# Patient Record
Sex: FEMALE | Race: WHITE | Employment: OTHER | URBAN - METROPOLITAN AREA
[De-identification: names, ages, dates, MRNs, and addresses within clinical notes are randomized per-mention and may not be internally consistent; named-entity substitution may affect disease eponyms.]

---

## 2017-10-02 ENCOUNTER — HOSPITAL ENCOUNTER (OUTPATIENT)
Dept: MAMMOGRAPHY | Age: 47
Discharge: HOME | End: 2017-10-02

## 2017-10-02 DIAGNOSIS — R22.32 LOCALIZED SWELLING, MASS AND LUMP, LEFT UPPER LIMB: ICD-10-CM

## 2017-10-02 DIAGNOSIS — R22.32 AXILLARY MASS, LEFT: ICD-10-CM

## 2017-10-02 DIAGNOSIS — Z12.31 SCREENING MAMMOGRAM, ENCOUNTER FOR: ICD-10-CM

## 2017-10-26 ENCOUNTER — HOSPITAL ENCOUNTER (OUTPATIENT)
Dept: MAMMOGRAPHY | Age: 47
Discharge: HOME | End: 2017-10-26
Attending: OBSTETRICS & GYNECOLOGY | Admitting: OBSTETRICS & GYNECOLOGY

## 2017-10-26 DIAGNOSIS — N63.10 MASS OF RIGHT BREAST: ICD-10-CM

## 2017-10-26 DIAGNOSIS — N63.20 LEFT BREAST LUMP: ICD-10-CM

## 2020-07-31 ENCOUNTER — APPOINTMENT (OUTPATIENT)
Dept: CT IMAGING | Age: 50
DRG: 390 | End: 2020-07-31
Payer: MEDICARE

## 2020-07-31 ENCOUNTER — APPOINTMENT (OUTPATIENT)
Dept: GENERAL RADIOLOGY | Age: 50
DRG: 390 | End: 2020-07-31
Payer: MEDICARE

## 2020-07-31 ENCOUNTER — HOSPITAL ENCOUNTER (INPATIENT)
Age: 50
LOS: 3 days | Discharge: HOME | DRG: 390 | End: 2020-08-03
Attending: STUDENT IN AN ORGANIZED HEALTH CARE EDUCATION/TRAINING PROGRAM | Admitting: HOSPITALIST
Payer: MEDICARE

## 2020-07-31 PROBLEM — K56.609 SBO (SMALL BOWEL OBSTRUCTION) (HCC): Status: ACTIVE | Noted: 2020-07-31

## 2020-07-31 LAB
A/G RATIO: 1.7 (ref 1.1–2.2)
ALBUMIN SERPL-MCNC: 5 G/DL (ref 3.4–5)
ALP BLD-CCNC: 63 U/L (ref 40–129)
ALT SERPL-CCNC: 8 U/L (ref 10–40)
ANION GAP SERPL CALCULATED.3IONS-SCNC: 14 MMOL/L (ref 3–16)
AST SERPL-CCNC: 13 U/L (ref 15–37)
BASOPHILS ABSOLUTE: 0.2 K/UL (ref 0–0.2)
BASOPHILS RELATIVE PERCENT: 1.1 %
BILIRUB SERPL-MCNC: <0.2 MG/DL (ref 0–1)
BILIRUBIN URINE: NEGATIVE
BLOOD, URINE: NEGATIVE
BUN BLDV-MCNC: 9 MG/DL (ref 7–20)
CALCIUM SERPL-MCNC: 10.1 MG/DL (ref 8.3–10.6)
CHLORIDE BLD-SCNC: 95 MMOL/L (ref 99–110)
CLARITY: CLEAR
CO2: 30 MMOL/L (ref 21–32)
COLOR: YELLOW
CREAT SERPL-MCNC: 0.7 MG/DL (ref 0.6–1.1)
EOSINOPHILS ABSOLUTE: 0.4 K/UL (ref 0–0.6)
EOSINOPHILS RELATIVE PERCENT: 2.2 %
GFR AFRICAN AMERICAN: >60
GFR NON-AFRICAN AMERICAN: >60
GLOBULIN: 2.9 G/DL
GLUCOSE BLD-MCNC: 100 MG/DL (ref 70–99)
GLUCOSE URINE: NEGATIVE MG/DL
HCT VFR BLD CALC: 47.2 % (ref 36–48)
HEMOGLOBIN: 15.8 G/DL (ref 12–16)
KETONES, URINE: NEGATIVE MG/DL
LACTIC ACID: 1.4 MMOL/L (ref 0.4–2)
LEUKOCYTE ESTERASE, URINE: NEGATIVE
LIPASE: 13 U/L (ref 13–60)
LYMPHOCYTES ABSOLUTE: 4.5 K/UL (ref 1–5.1)
LYMPHOCYTES RELATIVE PERCENT: 24.7 %
MCH RBC QN AUTO: 32.4 PG (ref 26–34)
MCHC RBC AUTO-ENTMCNC: 33.5 G/DL (ref 31–36)
MCV RBC AUTO: 96.8 FL (ref 80–100)
MICROSCOPIC EXAMINATION: NORMAL
MONOCYTES ABSOLUTE: 0.8 K/UL (ref 0–1.3)
MONOCYTES RELATIVE PERCENT: 4.3 %
NEUTROPHILS ABSOLUTE: 12.4 K/UL (ref 1.7–7.7)
NEUTROPHILS RELATIVE PERCENT: 67.7 %
NITRITE, URINE: NEGATIVE
PDW BLD-RTO: 14 % (ref 12.4–15.4)
PH UA: 7 (ref 5–8)
PLATELET # BLD: 379 K/UL (ref 135–450)
PMV BLD AUTO: 8.1 FL (ref 5–10.5)
POTASSIUM REFLEX MAGNESIUM: 3.6 MMOL/L (ref 3.5–5.1)
PROTEIN UA: NEGATIVE MG/DL
RBC # BLD: 4.87 M/UL (ref 4–5.2)
SODIUM BLD-SCNC: 139 MMOL/L (ref 136–145)
SPECIFIC GRAVITY UA: <=1.005 (ref 1–1.03)
TOTAL PROTEIN: 7.9 G/DL (ref 6.4–8.2)
URINE REFLEX TO CULTURE: NORMAL
URINE TYPE: NORMAL
UROBILINOGEN, URINE: 0.2 E.U./DL
WBC # BLD: 18.3 K/UL (ref 4–11)

## 2020-07-31 PROCEDURE — 2580000003 HC RX 258: Performed by: STUDENT IN AN ORGANIZED HEALTH CARE EDUCATION/TRAINING PROGRAM

## 2020-07-31 PROCEDURE — 6370000000 HC RX 637 (ALT 250 FOR IP): Performed by: INTERNAL MEDICINE

## 2020-07-31 PROCEDURE — 96375 TX/PRO/DX INJ NEW DRUG ADDON: CPT

## 2020-07-31 PROCEDURE — 1200000000 HC SEMI PRIVATE

## 2020-07-31 PROCEDURE — 99222 1ST HOSP IP/OBS MODERATE 55: CPT | Performed by: SURGERY

## 2020-07-31 PROCEDURE — 85025 COMPLETE CBC W/AUTO DIFF WBC: CPT

## 2020-07-31 PROCEDURE — 81003 URINALYSIS AUTO W/O SCOPE: CPT

## 2020-07-31 PROCEDURE — 80053 COMPREHEN METABOLIC PANEL: CPT

## 2020-07-31 PROCEDURE — 6360000002 HC RX W HCPCS: Performed by: STUDENT IN AN ORGANIZED HEALTH CARE EDUCATION/TRAINING PROGRAM

## 2020-07-31 PROCEDURE — 96365 THER/PROPH/DIAG IV INF INIT: CPT

## 2020-07-31 PROCEDURE — 6360000002 HC RX W HCPCS: Performed by: SURGERY

## 2020-07-31 PROCEDURE — 74177 CT ABD & PELVIS W/CONTRAST: CPT

## 2020-07-31 PROCEDURE — 2500000003 HC RX 250 WO HCPCS: Performed by: STUDENT IN AN ORGANIZED HEALTH CARE EDUCATION/TRAINING PROGRAM

## 2020-07-31 PROCEDURE — 2580000003 HC RX 258: Performed by: SURGERY

## 2020-07-31 PROCEDURE — 71045 X-RAY EXAM CHEST 1 VIEW: CPT

## 2020-07-31 PROCEDURE — 2500000003 HC RX 250 WO HCPCS: Performed by: SURGERY

## 2020-07-31 PROCEDURE — 83605 ASSAY OF LACTIC ACID: CPT

## 2020-07-31 PROCEDURE — 6360000004 HC RX CONTRAST MEDICATION: Performed by: STUDENT IN AN ORGANIZED HEALTH CARE EDUCATION/TRAINING PROGRAM

## 2020-07-31 PROCEDURE — 2580000003 HC RX 258

## 2020-07-31 PROCEDURE — C9113 INJ PANTOPRAZOLE SODIUM, VIA: HCPCS | Performed by: SURGERY

## 2020-07-31 PROCEDURE — 83690 ASSAY OF LIPASE: CPT

## 2020-07-31 PROCEDURE — 99285 EMERGENCY DEPT VISIT HI MDM: CPT

## 2020-07-31 RX ORDER — AMITRIPTYLINE HYDROCHLORIDE 50 MG/1
100 TABLET, FILM COATED ORAL NIGHTLY
Status: DISCONTINUED | OUTPATIENT
Start: 2020-07-31 | End: 2020-07-31

## 2020-07-31 RX ORDER — OXYCODONE HCL 40 MG/1
40 TABLET, FILM COATED, EXTENDED RELEASE ORAL EVERY 12 HOURS SCHEDULED
Status: DISCONTINUED | OUTPATIENT
Start: 2020-07-31 | End: 2020-08-03 | Stop reason: HOSPADM

## 2020-07-31 RX ORDER — SODIUM CHLORIDE 0.9 % (FLUSH) 0.9 %
SYRINGE (ML) INJECTION
Status: COMPLETED
Start: 2020-07-31 | End: 2020-07-31

## 2020-07-31 RX ORDER — DOCUSATE SODIUM 100 MG/1
100 CAPSULE, LIQUID FILLED ORAL 2 TIMES DAILY
COMMUNITY

## 2020-07-31 RX ORDER — AMITRIPTYLINE HYDROCHLORIDE 50 MG/1
50 TABLET, FILM COATED ORAL NIGHTLY
Status: DISCONTINUED | OUTPATIENT
Start: 2020-07-31 | End: 2020-08-03 | Stop reason: HOSPADM

## 2020-07-31 RX ORDER — DICYCLOMINE HCL 20 MG
20 TABLET ORAL EVERY 6 HOURS PRN
COMMUNITY

## 2020-07-31 RX ORDER — ONDANSETRON 2 MG/ML
4 INJECTION INTRAMUSCULAR; INTRAVENOUS ONCE
Status: COMPLETED | OUTPATIENT
Start: 2020-07-31 | End: 2020-07-31

## 2020-07-31 RX ORDER — ONDANSETRON 2 MG/ML
4 INJECTION INTRAMUSCULAR; INTRAVENOUS EVERY 4 HOURS PRN
Status: DISCONTINUED | OUTPATIENT
Start: 2020-07-31 | End: 2020-08-03 | Stop reason: HOSPADM

## 2020-07-31 RX ORDER — 0.9 % SODIUM CHLORIDE 0.9 %
1000 INTRAVENOUS SOLUTION INTRAVENOUS ONCE
Status: COMPLETED | OUTPATIENT
Start: 2020-07-31 | End: 2020-07-31

## 2020-07-31 RX ORDER — SODIUM CHLORIDE 9 MG/ML
INJECTION, SOLUTION INTRAVENOUS CONTINUOUS
Status: DISCONTINUED | OUTPATIENT
Start: 2020-07-31 | End: 2020-08-03

## 2020-07-31 RX ORDER — PANTOPRAZOLE SODIUM 40 MG/10ML
40 INJECTION, POWDER, LYOPHILIZED, FOR SOLUTION INTRAVENOUS DAILY
Status: DISCONTINUED | OUTPATIENT
Start: 2020-07-31 | End: 2020-08-03 | Stop reason: HOSPADM

## 2020-07-31 RX ORDER — LISINOPRIL AND HYDROCHLOROTHIAZIDE 12.5; 1 MG/1; MG/1
1 TABLET ORAL DAILY
Status: DISCONTINUED | OUTPATIENT
Start: 2020-07-31 | End: 2020-07-31

## 2020-07-31 RX ORDER — GABAPENTIN 400 MG/1
800 CAPSULE ORAL 4 TIMES DAILY
Status: DISCONTINUED | OUTPATIENT
Start: 2020-07-31 | End: 2020-07-31

## 2020-07-31 RX ORDER — OXYBUTYNIN CHLORIDE 15 MG/1
15 TABLET, EXTENDED RELEASE ORAL 2 TIMES DAILY
COMMUNITY

## 2020-07-31 RX ORDER — FENTANYL CITRATE 50 UG/ML
50 INJECTION, SOLUTION INTRAMUSCULAR; INTRAVENOUS ONCE
Status: COMPLETED | OUTPATIENT
Start: 2020-07-31 | End: 2020-07-31

## 2020-07-31 RX ORDER — GABAPENTIN 400 MG/1
400 CAPSULE ORAL 4 TIMES DAILY
Status: DISCONTINUED | OUTPATIENT
Start: 2020-07-31 | End: 2020-08-03 | Stop reason: HOSPADM

## 2020-07-31 RX ORDER — DIPHENHYDRAMINE HYDROCHLORIDE 50 MG/ML
25 INJECTION INTRAMUSCULAR; INTRAVENOUS EVERY 6 HOURS PRN
Status: DISCONTINUED | OUTPATIENT
Start: 2020-07-31 | End: 2020-08-03 | Stop reason: HOSPADM

## 2020-07-31 RX ORDER — PROMETHAZINE HYDROCHLORIDE 25 MG/1
25 TABLET ORAL EVERY 6 HOURS PRN
COMMUNITY

## 2020-07-31 RX ORDER — LEVOCETIRIZINE DIHYDROCHLORIDE 5 MG/1
5 TABLET, FILM COATED ORAL NIGHTLY
COMMUNITY

## 2020-07-31 RX ORDER — METRONIDAZOLE 250 MG/1
500 TABLET ORAL ONCE
Status: DISCONTINUED | OUTPATIENT
Start: 2020-07-31 | End: 2020-07-31

## 2020-07-31 RX ADMIN — GABAPENTIN 400 MG: 400 CAPSULE ORAL at 20:15

## 2020-07-31 RX ADMIN — HYDROMORPHONE HYDROCHLORIDE 1 MG: 1 INJECTION, SOLUTION INTRAMUSCULAR; INTRAVENOUS; SUBCUTANEOUS at 06:51

## 2020-07-31 RX ADMIN — ONDANSETRON HYDROCHLORIDE 4 MG: 2 INJECTION, SOLUTION INTRAMUSCULAR; INTRAVENOUS at 06:51

## 2020-07-31 RX ADMIN — IOPAMIDOL 75 ML: 755 INJECTION, SOLUTION INTRAVENOUS at 04:55

## 2020-07-31 RX ADMIN — HYDROMORPHONE HYDROCHLORIDE 1 MG: 1 INJECTION, SOLUTION INTRAMUSCULAR; INTRAVENOUS; SUBCUTANEOUS at 10:29

## 2020-07-31 RX ADMIN — SODIUM CHLORIDE: 9 INJECTION, SOLUTION INTRAVENOUS at 07:37

## 2020-07-31 RX ADMIN — OXYCODONE HYDROCHLORIDE 40 MG: 40 TABLET, FILM COATED, EXTENDED RELEASE ORAL at 20:16

## 2020-07-31 RX ADMIN — Medication 10 ML: at 10:29

## 2020-07-31 RX ADMIN — METRONIDAZOLE 500 MG: 500 INJECTION, SOLUTION INTRAVENOUS at 06:29

## 2020-07-31 RX ADMIN — ENOXAPARIN SODIUM 40 MG: 40 INJECTION SUBCUTANEOUS at 10:29

## 2020-07-31 RX ADMIN — AMITRIPTYLINE HYDROCHLORIDE 50 MG: 50 TABLET, FILM COATED ORAL at 20:16

## 2020-07-31 RX ADMIN — PANTOPRAZOLE SODIUM 40 MG: 40 INJECTION, POWDER, FOR SOLUTION INTRAVENOUS at 10:29

## 2020-07-31 RX ADMIN — SODIUM CHLORIDE 1000 ML: 9 INJECTION, SOLUTION INTRAVENOUS at 05:54

## 2020-07-31 RX ADMIN — SODIUM CHLORIDE 1000 ML: 9 INJECTION, SOLUTION INTRAVENOUS at 03:52

## 2020-07-31 RX ADMIN — ONDANSETRON HYDROCHLORIDE 4 MG: 2 INJECTION, SOLUTION INTRAMUSCULAR; INTRAVENOUS at 18:52

## 2020-07-31 RX ADMIN — ONDANSETRON HYDROCHLORIDE 4 MG: 2 INJECTION, SOLUTION INTRAMUSCULAR; INTRAVENOUS at 03:52

## 2020-07-31 RX ADMIN — FENTANYL CITRATE 50 MCG: 50 INJECTION, SOLUTION INTRAMUSCULAR; INTRAVENOUS at 03:53

## 2020-07-31 RX ADMIN — CEFTRIAXONE SODIUM 1 G: 1 INJECTION, POWDER, FOR SOLUTION INTRAMUSCULAR; INTRAVENOUS at 05:54

## 2020-07-31 ASSESSMENT — PAIN SCALES - GENERAL
PAINLEVEL_OUTOF10: 8
PAINLEVEL_OUTOF10: 8
PAINLEVEL_OUTOF10: 5
PAINLEVEL_OUTOF10: 8
PAINLEVEL_OUTOF10: 5
PAINLEVEL_OUTOF10: 10

## 2020-07-31 ASSESSMENT — PAIN DESCRIPTION - LOCATION
LOCATION: ABDOMEN
LOCATION: GENERALIZED

## 2020-07-31 ASSESSMENT — PAIN DESCRIPTION - PROGRESSION: CLINICAL_PROGRESSION: GRADUALLY WORSENING

## 2020-07-31 ASSESSMENT — PAIN - FUNCTIONAL ASSESSMENT: PAIN_FUNCTIONAL_ASSESSMENT: ACTIVITIES ARE NOT PREVENTED

## 2020-07-31 ASSESSMENT — PAIN DESCRIPTION - ONSET: ONSET: AWAKENED FROM SLEEP

## 2020-07-31 ASSESSMENT — PAIN DESCRIPTION - PAIN TYPE: TYPE: ACUTE PAIN

## 2020-07-31 NOTE — ED PROVIDER NOTES
status: Current Every Day Smoker     Packs/day: 0.50     Types: Cigarettes    Smokeless tobacco: Never Used   Substance and Sexual Activity    Alcohol use: Yes     Comment: rarely    Drug use: No    Sexual activity: None   Lifestyle    Physical activity     Days per week: None     Minutes per session: None    Stress: None   Relationships    Social connections     Talks on phone: None     Gets together: None     Attends Quaker service: None     Active member of club or organization: None     Attends meetings of clubs or organizations: None     Relationship status: None    Intimate partner violence     Fear of current or ex partner: None     Emotionally abused: None     Physically abused: None     Forced sexual activity: None   Other Topics Concern    None   Social History Narrative    None        Review of Systems   10 total systems reviewed and found to be negative unless otherwise noted in HPI     Physical Exam   BP (!) 94/56   Pulse 88   Temp 98.9 °F (37.2 °C) (Oral)   Resp 16   Ht 5' 3.5\" (1.613 m)   Wt 156 lb (70.8 kg)   LMP 08/24/2013   SpO2 94%   BMI 27.20 kg/m²      CONSTITUTIONAL: Appears to be in distress and moaning in pain  HEAD: atraumatic, normocephalic   EYES: PERRL, No injection, discharge or scleral icterus. ENT: Moist mucous membranes. NECK: Normal ROM, NO LAD   CARDIOVASCULAR: Regular rate and rhythm. No murmurs or gallop. PULMONARY/CHEST: Airway patent. No retractions. Breath sounds clear with good air entry bilaterally. ABDOMEN: Soft, Non-distended but diffusely tender with some rebound  SKIN: Acyanotic, warm, dry   MUSCULOSKELETAL: No swelling, tenderness or deformity   NEUROLOGICAL: Awake and oriented x 3. Pulses intact. Grossly nonfocal   Nursing note and vitals reviewed.      ED Course & Medical Decision Making   Medications   cefTRIAXone (ROCEPHIN) 1 g IVPB in 50 mL D5W minibag (0 g Intravenous Stopped 7/31/20 5847)   0.9 % sodium chloride bolus (1,000 mLs transition point in the left lower quadrant. Mild degree of likely reactive associated mesenteric edema. Findings may   relate to adhesions. Mild biliary ductal dilatation without obvious etiology. Gallbladder still   in place. No pancreatic or other liver abnormality. Clinical and laboratory   correlation suggested and if concern for biliary obstruction consider further   evaluation with MRI/MRCP or ERCP. XR CHEST PORTABLE   Final Result   No acute cardiopulmonary abnormality. No pneumoperitoneum. PROCEDURES:   Procedures    ASSESSMENT AND PLAN:  Jay Mari is a 51 y.o. female history of bowel perforation with 2 small bowel obstructions which were medically managed presenting this morning complaining of abdominal pain started last night persisted forcing her to seek care. On exam patient clear to be in distress and moaning in pain and abdomen was tender to palpation diffusely with some guarding. Given her history and presentation I was concerned for bowel obstruction and I did obtain labs as well as imaging. Labs with leukocytosis of 18 but normal lactate. CT showed bowel obstruction with transition point in the lower left. I believe that her pain is secondary to bowel obstruction. Because of leukocytosis I did order ceftriaxone and Flagyl. Surgery was consulted and recommended no intervention at this time with medical management with NG tube. Attempted to place NG but patient refused and stated that she has had significant problems in the past.  I did talk to the surgeon who recommended another NG tube placement but patient again refused. Please see nurses note. ClINICAL IMPRESSION:  1. Small bowel obstruction ([address]        DISPOSITION Admitted 07/31/2020 06:23:25 AM   -Findings and recommendations explained to patient. She expressed understanding and agreed with the plan.   Shanita Parks MD (electronically

## 2020-07-31 NOTE — PROGRESS NOTES
Pt's primary RN Anabelle notified this RN that Pt had home medication bottle out of purse while she was in room. This RN went to bedside and requested that all home medications be locked up until Pt's friend Tulio Clark left the building and is able to take medications home. Pt states that Tulio Clark is leaving right away and that she would take the medications. This RN searched Pt's remaining belongings to confirm that all medications have been removed. Upon searching Pt's purse additional bottle of pills removed and given to Pt's friend Mark Miller to take home. Pt insists that she would never take any of her home medications and denies taking any medications other than what has been given by hospital nurse. When this RN left room Pt's friend Tulio Clark stopped in hallway and told this RN that she has been friends with the patient for about a year now and she believes that she is very dependent on her medications. Tulio Clark states that she is glad that we insisted that medications be removed and that she has concerns that Pt might have taken additional medication while Tulio Clark was in the bathroom. Pt remains in bed with purse beside her. Pt alert and oriented respirations easy and unlabored. Primary RN Bernarda Keith notified about all listed above.

## 2020-07-31 NOTE — PROGRESS NOTES
Telesitter placed at bedside for patient's safety. Pt resting in bed with eyes closed at this time, respirations easy and unlabored. Call light within reach, bed alarm on. Primary RN Anabelle aware.

## 2020-07-31 NOTE — PLAN OF CARE
Problem: Pain:  Goal: Pain level will decrease  Description: Pain level will decrease  Outcome: Ongoing     Problem: Pain:  Goal: Control of acute pain  Description: Control of acute pain  Outcome: Ongoing     Problem: Pain:  Goal: Control of chronic pain  Description: Control of chronic pain  Outcome: Ongoing     Problem: Falls - Risk of:  Goal: Will remain free from falls  Description: Will remain free from falls  Outcome: Ongoing     Problem: Falls - Risk of:  Goal: Absence of physical injury  Description: Absence of physical injury  Outcome: Ongoing

## 2020-07-31 NOTE — ED NOTES
0872 Dr Rosalee Rocha called back and spoke to Dr Jt Trevino, consult completed     Rose Marie Juarez  07/31/20 9796
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
## 2020-07-31 NOTE — PROGRESS NOTES
Pt assisted up to bathroom. Pt passing gas. Also c/o abdominal pain 7/10 unable to medicate d/t blood pressure  low.

## 2020-07-31 NOTE — H&P
down. So, she came to the ER for evaluation. In the ER, the patient was noted to have a leukocytosis. A CT of the abdomen and pelvis was performed and demonstrated the above findings. We admitted the patient for intravenous fluids, serial abdominal examinations and intravenous pain/nausea control. PT has a pain management contract she takes Oxy 30 mg 4 times per day and OxyContin 60 mg BID. The pain is in her back. The patient was seen and evaluated by Dr. Panchito Emanuel on 9/28/16 for a SBO which resolved with conservative treatment. Pt reports this pain feels similar. Currently, the patient states her abdominal pain \"is improved. \" She denies any nausea or vomiting at this time. PSBO 4/10/12: resolved with conservative treatment. Past Medical History   has a past medical history of Collapsed vertebra ([address] Depression, Hyperlipidemia, Hypertension, Knee gives out, and Leg length inequality. Past Surgical History   has a past surgical history that includes Small intestine surgery (1996); Appendectomy; Ovary removal; knee surgery (Left, 6/27/13); ovarian cyst removal; and Colonoscopy (10/24/13). Pt reports ex lap with SBR and 15 laparoscopic GYN related surgeries. Medications  Prior to Admission medications    Medication Sig Start Date End Date Taking?  Authorizing Provider   docusate sodium (COLACE) 100 MG capsule Take 100 mg by mouth 2 times daily   Yes Historical Provider, MD   oxybutynin (DITROPAN XL) 15 MG extended release tablet Take 15 mg by mouth 2 times daily   Yes Historical Provider, MD   promethazine (PHENERGAN) 25 MG tablet Take 25 mg by mouth every 6 hours as needed for Nausea   Yes Historical Provider, MD   dicyclomine (BENTYL) 20 MG tablet Take 20 mg by mouth every 6 hours as needed (For cramps)   Yes Historical Provider, MD   bisacodyl (DULCOLAX) 5 MG EC tablet Take 5 mg by mouth daily   Yes Historical Provider, MD   levocetirizine (XYZAL) 5 MG tablet Take 5 mg by mouth nightly   Yes Historical Provider, MD   simvastatin (ZOCOR) 40 MG tablet Take 40 mg by mouth nightly   Yes Historical Provider, MD   lisinopril-hydrochlorothiazide (PRINZIDE;ZESTORETIC) 10-12.5 MG per tablet Take 1 tablet by mouth daily   Yes Historical Provider, MD   oxyCODONE (OXY-IR) 15 MG immediate release tablet Take 30 mg by mouth every 6 hours as needed for Pain. Yes Historical Provider, MD   omeprazole (PRILOSEC) 40 MG capsule Take 40 mg by mouth daily. Yes Historical Provider, MD   gabapentin (NEURONTIN) 300 MG capsule Take 800 mg by mouth 4 times daily. Yes Historical Provider, MD   amitriptyline (ELAVIL) 100 MG tablet Take 100 mg by mouth nightly. Yes Historical Provider, MD   oxycodone (OXYCONTIN) 40 MG CR tablet Take 60 mg by mouth every 12 hours. Yes Historical Provider, MD    Scheduled Meds:   pantoprazole  40 mg Intravenous Daily    enoxaparin  40 mg Subcutaneous Daily    amitriptyline  50 mg Oral Nightly    oxyCODONE  40 mg Oral 2 times per day    gabapentin  400 mg Oral 4x Daily     Continuous Infusions:   sodium chloride 125 mL/hr at 07/31/20 0737     PRN Meds:.phenol, HYDROmorphone, ondansetron, diphenhydrAMINE    Allergies  is allergic to morphine sulfate; ampicillin; and penicillins. Family History  family history includes Cancer in her paternal grandmother; Heart Attack in her father; No Known Problems in her mother. Social History   reports that she has been smoking cigarettes. She has a 15.00 pack-year smoking history. She has never used smokeless tobacco. She reports current alcohol use. She reports that she does not use drugs.     Review of Systems:  General Denies any fever or chills  HEENT Denies any diplopia, tinnitus or vertigo  Resp Denies any shortness of breath, cough or wheezing  Cardiac Denies any chest pain, palpitations, claudication or edema  GI Positive for nausea, vomiting and abdominal pain  Denies any melena, hematochezia, hematemesis or pyrosis   Denies any frequency, MCHC 33.5 07/31/2020    RDW 14.0 07/31/2020    SEGSPCT 54.1 04/11/2012    BANDSPCT 1 09/29/2016    LYMPHOPCT 24.7 07/31/2020    MONOPCT 4.3 07/31/2020    MYELOPCT 1 09/29/2016    EOSPCT 2.8 04/11/2012    BASOPCT 1.1 07/31/2020    MONOSABS 0.8 07/31/2020    LYMPHSABS 4.5 07/31/2020    EOSABS 0.4 07/31/2020    BASOSABS 0.2 07/31/2020    DIFFTYPE Auto 04/11/2012     CMP:    Lab Results   Component Value Date     07/31/2020    K 3.6 07/31/2020    CL 95 07/31/2020    CO2 30 07/31/2020    BUN 9 07/31/2020    CREATININE 0.7 07/31/2020    GFRAA >60 07/31/2020    GFRAA >60 04/11/2012    AGRATIO 1.7 07/31/2020    LABGLOM >60 07/31/2020    GLUCOSE 100 07/31/2020    PROT 7.9 07/31/2020    PROT 7.5 04/09/2012    LABALBU 5.0 07/31/2020    CALCIUM 10.1 07/31/2020    BILITOT <0.2 07/31/2020    ALKPHOS 63 07/31/2020    AST 13 07/31/2020    ALT 8 07/31/2020       RADIOLOGY:   I have personally reviewed the following films:  CT scan abd/pelvis: See radiology report        Electronically signed by MACKENZIE Mason CNP on 7/31/2020 at 2:21 PM

## 2020-07-31 NOTE — CARE COORDINATION
Case Management Assessment  Initial Evaluation    Date/Time of Evaluation: 7/31/2020 2:26 PM  Assessment Completed by: Pacheco Hinds    Patient Name: Lisa Tamez  YOB: 1970  Diagnosis: SBO (small bowel obstruction) ([address] [H86.311]  Date / Time: 7/31/2020  3:05 AM  Admission status/Date:7/31/2020 0305 inpt  Chart Reviewed: Yes      Patient Interviewed: Yes   Family Interviewed:  No      Hospitalization in the last 30 days:  No    Contacts  :     Relationship to Patient:   Phone Number:    Alternate Contact:     Relationship to Patient:     Phone Number:    Met with:    Current PCP  Dr. Eligio Guardado in [address] [place] [address] Avenue required for SNF : [facility]        3 night stay required - Y, N    ADLS  Support Systems: Friends/Neighbors  Transportation: self    Meal Preparation: self    Housing  Home Environment: 2nd floor apt alone  Steps: 8  Plans to Return to Present Housing: Yes  Other Identified Issues:     Home Care Information  Currently active with [address] : No  Type of Home Care Services: None  Passport/Waiver : No  :                      Phone Number:    Passport/Waiver Services:           Durable Medical Equipment   DME Provider:   Equipment: Yes Walker___Cane_X__RTS___ BSC___Shower Chair_X__  02__ at ____Liter(s)---Uses________HHN___ CPAP___ BiPap___ Hospital Bed___W/C____Other________      Has Home O2 in place on admit:  No    If above answer is No ---is pt presently on O2 during hospitalization:  No  if yes CM to follow for potential DC O2 need  Informed of need to bring portable home O2 tank on day of discharge for nursing to connect prior to leaving:   Not Indicated  Verbalized agreement/Understanding:   Not Indicated    Community Service Affiliation  Dialysis:  No    · Name:  · Location  · Dialysis Schedule:  · Phone:   · Fax:     Outpatient PT/OT: No    Cancer Center: No     CHF Clinic: No     Pulmonary Rehab: No  Pain Clinic: Yes -  [facility] for pain management  [facility]: No    Wound Clinic: No     COVID SCREENING: No       Other: The Plan for Transition of Care is related to the following treatment goals: home    The Patient and/or patient representative pt was provided with a choice of provider and agrees   with the discharge plan. [x] Yes [] No    Freedom of choice list was provided with basic dialogue that supports the patient's individualized plan of care/goals, treatment preferences and shares the quality data associated with the providers. [x] Yes [] No    DISCHARGE PLAN: Reviewed chart. Role of discharge planner explained and patient verbalized understanding. When CM walked in, pt had blankets over her head. CM started to speak to pt, and while CM spoke with pt, she had her blankets over her head. CMasked and offered to pull the blankets from her head and she said \"no\". Pt continued to have the blankets over her head the entire conversation and was slow to answer any question asked. Pt states that she is from a 2nd floor apt alone and plans to return. Pt states that she is active with Dr. Keyana Fields for Pain management. Pt states that she Is disabled d/t back and knee pain. CM did informed LULY Ahn of how pt was during interview with verbalized understanding. Also see Jeferson Eaton RN's note. Explained Case Management role/services.

## 2020-07-31 NOTE — FLOWSHEET NOTE
07/31/20 1845   Vital Signs   Pulse 76   BP 94/60   Pt c/o abdominal pain and nausea. Unable to give pain medication d/t low BP. PRN Zofran given at this time.

## 2020-07-31 NOTE — ED NOTES
Pt report given to LULY Avalos. She is aware that pt has urine in their bathroom to collect for sample and that I could not give IV pain meds in ER before pt came to floor due to SBP.       Jeniffer Casas RN  07/31/20 3329

## 2020-07-31 NOTE — ED NOTES
2285 Call General Surgery for consult, Representative didn't leave name of on call doctor     Shawna Galicia  07/31/20 [phone]

## 2020-08-01 ENCOUNTER — APPOINTMENT (OUTPATIENT)
Dept: GENERAL RADIOLOGY | Age: 50
DRG: 390 | End: 2020-08-01
Payer: MEDICARE

## 2020-08-01 LAB
ANION GAP SERPL CALCULATED.3IONS-SCNC: 9 MMOL/L (ref 3–16)
BASOPHILS ABSOLUTE: 0.1 K/UL (ref 0–0.2)
BASOPHILS RELATIVE PERCENT: 0.8 %
BUN BLDV-MCNC: 8 MG/DL (ref 7–20)
CALCIUM SERPL-MCNC: 8.3 MG/DL (ref 8.3–10.6)
CHLORIDE BLD-SCNC: 113 MMOL/L (ref 99–110)
CO2: 20 MMOL/L (ref 21–32)
CREAT SERPL-MCNC: <0.5 MG/DL (ref 0.6–1.1)
EOSINOPHILS ABSOLUTE: 0.4 K/UL (ref 0–0.6)
EOSINOPHILS RELATIVE PERCENT: 3.5 %
GFR AFRICAN AMERICAN: >60
GFR NON-AFRICAN AMERICAN: >60
GLUCOSE BLD-MCNC: 80 MG/DL (ref 70–99)
HCT VFR BLD CALC: 36 % (ref 36–48)
HEMOGLOBIN: 11.7 G/DL (ref 12–16)
LYMPHOCYTES ABSOLUTE: 4.5 K/UL (ref 1–5.1)
LYMPHOCYTES RELATIVE PERCENT: 45.1 %
MCH RBC QN AUTO: 32 PG (ref 26–34)
MCHC RBC AUTO-ENTMCNC: 32.4 G/DL (ref 31–36)
MCV RBC AUTO: 98.7 FL (ref 80–100)
MONOCYTES ABSOLUTE: 0.7 K/UL (ref 0–1.3)
MONOCYTES RELATIVE PERCENT: 6.7 %
NEUTROPHILS ABSOLUTE: 4.4 K/UL (ref 1.7–7.7)
NEUTROPHILS RELATIVE PERCENT: 43.9 %
PDW BLD-RTO: 14 % (ref 12.4–15.4)
PLATELET # BLD: 285 K/UL (ref 135–450)
PMV BLD AUTO: 7.6 FL (ref 5–10.5)
POTASSIUM SERPL-SCNC: 3.6 MMOL/L (ref 3.5–5.1)
RBC # BLD: 3.65 M/UL (ref 4–5.2)
SODIUM BLD-SCNC: 142 MMOL/L (ref 136–145)
WBC # BLD: 10.1 K/UL (ref 4–11)

## 2020-08-01 PROCEDURE — 99232 SBSQ HOSP IP/OBS MODERATE 35: CPT | Performed by: SURGERY

## 2020-08-01 PROCEDURE — C9113 INJ PANTOPRAZOLE SODIUM, VIA: HCPCS | Performed by: SURGERY

## 2020-08-01 PROCEDURE — 80048 BASIC METABOLIC PNL TOTAL CA: CPT

## 2020-08-01 PROCEDURE — 85025 COMPLETE CBC W/AUTO DIFF WBC: CPT

## 2020-08-01 PROCEDURE — 6360000002 HC RX W HCPCS: Performed by: SURGERY

## 2020-08-01 PROCEDURE — 74019 RADEX ABDOMEN 2 VIEWS: CPT

## 2020-08-01 PROCEDURE — 6370000000 HC RX 637 (ALT 250 FOR IP): Performed by: INTERNAL MEDICINE

## 2020-08-01 PROCEDURE — 1200000000 HC SEMI PRIVATE

## 2020-08-01 PROCEDURE — 36415 COLL VENOUS BLD VENIPUNCTURE: CPT

## 2020-08-01 PROCEDURE — 2580000003 HC RX 258: Performed by: SURGERY

## 2020-08-01 PROCEDURE — 2580000003 HC RX 258

## 2020-08-01 RX ORDER — SODIUM CHLORIDE 0.9 % (FLUSH) 0.9 %
SYRINGE (ML) INJECTION
Status: COMPLETED
Start: 2020-08-01 | End: 2020-08-01

## 2020-08-01 RX ADMIN — OXYCODONE HYDROCHLORIDE 40 MG: 40 TABLET, FILM COATED, EXTENDED RELEASE ORAL at 09:27

## 2020-08-01 RX ADMIN — GABAPENTIN 400 MG: 400 CAPSULE ORAL at 09:27

## 2020-08-01 RX ADMIN — OXYCODONE HYDROCHLORIDE 40 MG: 40 TABLET, FILM COATED, EXTENDED RELEASE ORAL at 20:11

## 2020-08-01 RX ADMIN — SODIUM CHLORIDE: 9 INJECTION, SOLUTION INTRAVENOUS at 23:59

## 2020-08-01 RX ADMIN — GABAPENTIN 400 MG: 400 CAPSULE ORAL at 12:52

## 2020-08-01 RX ADMIN — ENOXAPARIN SODIUM 40 MG: 40 INJECTION SUBCUTANEOUS at 09:26

## 2020-08-01 RX ADMIN — GABAPENTIN 400 MG: 400 CAPSULE ORAL at 17:00

## 2020-08-01 RX ADMIN — ONDANSETRON HYDROCHLORIDE 4 MG: 2 INJECTION, SOLUTION INTRAMUSCULAR; INTRAVENOUS at 03:52

## 2020-08-01 RX ADMIN — ONDANSETRON HYDROCHLORIDE 4 MG: 2 INJECTION, SOLUTION INTRAMUSCULAR; INTRAVENOUS at 20:05

## 2020-08-01 RX ADMIN — GABAPENTIN 400 MG: 400 CAPSULE ORAL at 19:59

## 2020-08-01 RX ADMIN — SODIUM CHLORIDE: 9 INJECTION, SOLUTION INTRAVENOUS at 15:20

## 2020-08-01 RX ADMIN — ONDANSETRON HYDROCHLORIDE 4 MG: 2 INJECTION, SOLUTION INTRAMUSCULAR; INTRAVENOUS at 15:19

## 2020-08-01 RX ADMIN — PANTOPRAZOLE SODIUM 40 MG: 40 INJECTION, POWDER, FOR SOLUTION INTRAVENOUS at 09:26

## 2020-08-01 RX ADMIN — AMITRIPTYLINE HYDROCHLORIDE 50 MG: 50 TABLET, FILM COATED ORAL at 19:59

## 2020-08-01 RX ADMIN — Medication 10 ML: at 09:27

## 2020-08-01 ASSESSMENT — PAIN SCALES - GENERAL
PAINLEVEL_OUTOF10: 6
PAINLEVEL_OUTOF10: 5
PAINLEVEL_OUTOF10: 8
PAINLEVEL_OUTOF10: 6
PAINLEVEL_OUTOF10: 9

## 2020-08-01 ASSESSMENT — PAIN DESCRIPTION - PAIN TYPE
TYPE: ACUTE PAIN
TYPE: ACUTE PAIN

## 2020-08-01 ASSESSMENT — PAIN DESCRIPTION - FREQUENCY: FREQUENCY: CONTINUOUS

## 2020-08-01 ASSESSMENT — PAIN DESCRIPTION - DESCRIPTORS: DESCRIPTORS: PINS AND NEEDLES

## 2020-08-01 ASSESSMENT — PAIN DESCRIPTION - LOCATION: LOCATION: ABDOMEN

## 2020-08-01 NOTE — PROGRESS NOTES
Pt. Awake and oriented, lying in bed. PRN nausea medicine given per pt. request. Could not give requested pain medicine due to BP 88/49.

## 2020-08-01 NOTE — PROGRESS NOTES
Bedside report given and pt care transferred to [facility]. Pt denies needs at this time. Call light within reach.

## 2020-08-01 NOTE — PROGRESS NOTES
Patient in bed with eyes open and alert. BP improved 101/65 with no c/o nausea. Patient educated on pain medication and vital signs monitoring. Patient will try po med to start. All safety maintained and call light within reach.

## 2020-08-01 NOTE — PROGRESS NOTES
[facility]    PATIENT NAME: Lisa Tamez     TODAY'S DATE: 8/1/2020    CHIEF COMPLAINT: Abdominal pain    INTERVAL HISTORY/HPI:    Pt states her pain is better but still no flatus or BM. REVIEW OF SYSTEMS:  Pertinent positives and negatives as per interval history section    OBJECTIVE:  VITALS:  /63   Pulse 70   Temp 97.6 °F (36.4 °C) (Oral)   Resp 16   Ht 5' 3.5\" (1.613 m)   Wt 156 lb (70.8 kg)   LMP 08/24/2013   SpO2 98%   BMI 27.20 kg/m²     INTAKE/OUTPUT:    I/O last 3 completed shifts: In: 0   Out: 900 [Urine:900]  No intake/output data recorded. CONSTITUTIONAL:  awake and alert  LUNGS:  Respirations easy and unlabored, clear to auscultation  CARD:  regular rate and rhythm  ABDOMEN:  normal bowel sounds, soft, non-distended, mild tenderness noted diffusely     Data:  CBC:   Recent Labs     07/31/20  0345 08/01/20  0539   WBC 18.3* 10.1   HGB 15.8 11.7*   HCT 47.2 36.0    285     BMP:    Recent Labs     07/31/20 0345 08/01/20  0539    142   K 3.6 3.6   CL 95* 113*   CO2 30 20*   BUN 9 8   CREATININE 0.7 <0.5*   GLUCOSE 100* 80     Hepatic:   Recent Labs     07/31/20  0345   AST 13*   ALT 8*   BILITOT <0.2   ALKPHOS 63     Mag:    No results for input(s): MG in the last 72 hours. Phos:   No results for input(s): PHOS in the last 72 hours. INR: No results for input(s): INR in the last 72 hours. Radiology Review:  *Imaging personally reviewed by me. Nonspecific abdominal bowel gas pattern.  It is unclear whether the small   bowel distention has resolved or is poorly visualized on plain film due to   fluid throughout the small bowel. My review suggest more gas in the colon      ASSESSMENT AND PLAN:  Small bowel obstruction. Clinically improved with resolution of leukocytosis and improve discomfort. Continue IV fluid hydration, parenteral narcotics as needed, and bowel rest.  Recheck labs and x-rays in the morning.   Await return of GI function to start a diet.      Electronically signed by Raheem Navarro MD     [id number]

## 2020-08-02 ENCOUNTER — APPOINTMENT (OUTPATIENT)
Dept: GENERAL RADIOLOGY | Age: 50
DRG: 390 | End: 2020-08-02
Payer: MEDICARE

## 2020-08-02 LAB
ANION GAP SERPL CALCULATED.3IONS-SCNC: 9 MMOL/L (ref 3–16)
BUN BLDV-MCNC: 12 MG/DL (ref 7–20)
CALCIUM SERPL-MCNC: 7.9 MG/DL (ref 8.3–10.6)
CHLORIDE BLD-SCNC: 112 MMOL/L (ref 99–110)
CO2: 18 MMOL/L (ref 21–32)
CREAT SERPL-MCNC: 0.6 MG/DL (ref 0.6–1.1)
GFR AFRICAN AMERICAN: >60
GFR NON-AFRICAN AMERICAN: >60
GLUCOSE BLD-MCNC: 113 MG/DL (ref 70–99)
MAGNESIUM: 2 MG/DL (ref 1.8–2.4)
PHOSPHORUS: 2.7 MG/DL (ref 2.5–4.9)
POTASSIUM SERPL-SCNC: 3.7 MMOL/L (ref 3.5–5.1)
SODIUM BLD-SCNC: 139 MMOL/L (ref 136–145)

## 2020-08-02 PROCEDURE — 6370000000 HC RX 637 (ALT 250 FOR IP): Performed by: INTERNAL MEDICINE

## 2020-08-02 PROCEDURE — 80048 BASIC METABOLIC PNL TOTAL CA: CPT

## 2020-08-02 PROCEDURE — 99232 SBSQ HOSP IP/OBS MODERATE 35: CPT | Performed by: SURGERY

## 2020-08-02 PROCEDURE — C9113 INJ PANTOPRAZOLE SODIUM, VIA: HCPCS | Performed by: SURGERY

## 2020-08-02 PROCEDURE — 6360000002 HC RX W HCPCS: Performed by: SURGERY

## 2020-08-02 PROCEDURE — 36415 COLL VENOUS BLD VENIPUNCTURE: CPT

## 2020-08-02 PROCEDURE — 84100 ASSAY OF PHOSPHORUS: CPT

## 2020-08-02 PROCEDURE — 6370000000 HC RX 637 (ALT 250 FOR IP): Performed by: SURGERY

## 2020-08-02 PROCEDURE — 74019 RADEX ABDOMEN 2 VIEWS: CPT

## 2020-08-02 PROCEDURE — 83735 ASSAY OF MAGNESIUM: CPT

## 2020-08-02 PROCEDURE — 1200000000 HC SEMI PRIVATE

## 2020-08-02 PROCEDURE — 2500000003 HC RX 250 WO HCPCS: Performed by: SURGERY

## 2020-08-02 PROCEDURE — 2580000003 HC RX 258: Performed by: SURGERY

## 2020-08-02 RX ORDER — BISACODYL 10 MG
10 SUPPOSITORY, RECTAL RECTAL ONCE
Status: COMPLETED | OUTPATIENT
Start: 2020-08-02 | End: 2020-08-02

## 2020-08-02 RX ADMIN — AMITRIPTYLINE HYDROCHLORIDE 50 MG: 50 TABLET, FILM COATED ORAL at 21:01

## 2020-08-02 RX ADMIN — BISACODYL 10 MG: 10 SUPPOSITORY RECTAL at 14:20

## 2020-08-02 RX ADMIN — GABAPENTIN 400 MG: 400 CAPSULE ORAL at 18:21

## 2020-08-02 RX ADMIN — ONDANSETRON HYDROCHLORIDE 4 MG: 2 INJECTION, SOLUTION INTRAMUSCULAR; INTRAVENOUS at 08:14

## 2020-08-02 RX ADMIN — GABAPENTIN 400 MG: 400 CAPSULE ORAL at 14:20

## 2020-08-02 RX ADMIN — HYDROMORPHONE HYDROCHLORIDE 1 MG: 1 INJECTION, SOLUTION INTRAMUSCULAR; INTRAVENOUS; SUBCUTANEOUS at 01:37

## 2020-08-02 RX ADMIN — PANTOPRAZOLE SODIUM 40 MG: 40 INJECTION, POWDER, FOR SOLUTION INTRAVENOUS at 10:03

## 2020-08-02 RX ADMIN — GABAPENTIN 400 MG: 400 CAPSULE ORAL at 10:01

## 2020-08-02 RX ADMIN — ENOXAPARIN SODIUM 40 MG: 40 INJECTION SUBCUTANEOUS at 10:02

## 2020-08-02 RX ADMIN — SODIUM CHLORIDE: 9 INJECTION, SOLUTION INTRAVENOUS at 21:01

## 2020-08-02 RX ADMIN — OXYCODONE HYDROCHLORIDE 40 MG: 40 TABLET, FILM COATED, EXTENDED RELEASE ORAL at 10:00

## 2020-08-02 RX ADMIN — OXYCODONE HYDROCHLORIDE 40 MG: 40 TABLET, FILM COATED, EXTENDED RELEASE ORAL at 21:01

## 2020-08-02 RX ADMIN — GABAPENTIN 400 MG: 400 CAPSULE ORAL at 21:01

## 2020-08-02 RX ADMIN — ONDANSETRON HYDROCHLORIDE 4 MG: 2 INJECTION, SOLUTION INTRAMUSCULAR; INTRAVENOUS at 00:04

## 2020-08-02 RX ADMIN — ONDANSETRON HYDROCHLORIDE 4 MG: 2 INJECTION, SOLUTION INTRAMUSCULAR; INTRAVENOUS at 14:21

## 2020-08-02 RX ADMIN — SODIUM CHLORIDE: 9 INJECTION, SOLUTION INTRAVENOUS at 17:07

## 2020-08-02 ASSESSMENT — PAIN DESCRIPTION - LOCATION
LOCATION: KNEE
LOCATION: BACK;KNEE

## 2020-08-02 ASSESSMENT — PAIN DESCRIPTION - FREQUENCY: FREQUENCY: INTERMITTENT

## 2020-08-02 ASSESSMENT — PAIN SCALES - GENERAL
PAINLEVEL_OUTOF10: 0
PAINLEVEL_OUTOF10: 7
PAINLEVEL_OUTOF10: 2
PAINLEVEL_OUTOF10: 7
PAINLEVEL_OUTOF10: 6
PAINLEVEL_OUTOF10: 7
PAINLEVEL_OUTOF10: 4
PAINLEVEL_OUTOF10: 6
PAINLEVEL_OUTOF10: 6

## 2020-08-02 ASSESSMENT — PAIN DESCRIPTION - PAIN TYPE
TYPE: CHRONIC PAIN
TYPE: CHRONIC PAIN
TYPE: ACUTE PAIN
TYPE: ACUTE PAIN

## 2020-08-02 ASSESSMENT — PAIN DESCRIPTION - ONSET: ONSET: ON-GOING

## 2020-08-02 ASSESSMENT — PAIN DESCRIPTION - DESCRIPTORS: DESCRIPTORS: DISCOMFORT

## 2020-08-02 ASSESSMENT — PAIN DESCRIPTION - ORIENTATION: ORIENTATION: RIGHT

## 2020-08-02 NOTE — PROGRESS NOTES
Patient in bed with eyes closed. States improvement with pain at this time after IV pain med given during shift. No c/o nausea at this time as well. Writer did reach out to surgery about poss diet and continue NPO at this time. All safety maintained at this time. Call light within reach.

## 2020-08-02 NOTE — PROGRESS NOTES
Pt. In bed with eyes closed. Stated pain level 6/10. Alert and oriented x4. Bed alarm on. Given scheduled pain medicine. See MAR. Ice chips given. Call light within reach.

## 2020-08-02 NOTE — PROGRESS NOTES
a suppository for apparent constipation on imaging.   Further plans based on her clinical course     Electronically signed by Alexandra Del Toro MD     [id number]

## 2020-08-02 NOTE — PROGRESS NOTES
Patient in bed alert and crying due to abd pain lower. Patient also c/o nausea, Prn nausea medication given. Writer educated again or vitals and reviewed all medications with patient at this time. Writer also reviewed current plan of care noted by MD notes. Patient agreed and voiced understanding. Once patient was calm she was able to intake PO medication that included pain medication as well. Writer will eval pain in one hour to follow up. Patient amb with steady gait to bathroom and was able to void. All safety maintained and call light within reach.

## 2020-08-02 NOTE — PLAN OF CARE
Problem: Pain:  Goal: Pain level will decrease  Description: Pain level will decrease  8/1/2020 2049 by Ki Young RN  Outcome: Ongoing  8/1/2020 1240 by Nan Berry RN  Outcome: Ongoing  Goal: Control of acute pain  Description: Control of acute pain  8/1/2020 2049 by Ki Young RN  Outcome: Ongoing  8/1/2020 1240 by Nan Berry RN  Outcome: Ongoing  Goal: Control of chronic pain  Description: Control of chronic pain  8/1/2020 2049 by Ki Young RN  Outcome: Ongoing  8/1/2020 1240 by Nan Berry RN  Outcome: Ongoing     Problem: Falls - Risk of:  Goal: Will remain free from falls  Description: Will remain free from falls  8/1/2020 2049 by Ki Young RN  Outcome: Ongoing  8/1/2020 1240 by Nan Berry RN  Outcome: Ongoing  Goal: Absence of physical injury  Description: Absence of physical injury  8/1/2020 2049 by Ki Young RN  Outcome: Ongoing  8/1/2020 1240 by Nan Berry RN  Outcome: Ongoing

## 2020-08-02 NOTE — FLOWSHEET NOTE
08/02/20 1636   Encounter Summary   Services provided to: Patient   Referral/Consult From: [facility]   Place of [facility]   (none)   Continue Visiting   (pt inpain; no spiritual needs)   Complexity of Encounter High   Length of Encounter 15 minutes   Spiritual Assessment Completed Yes   Routine   Type Initial   Spiritual/Mosque   Type Spiritual support   Assessment Anxious; Concerns with suffering   Intervention Active listening;Nurtured hope   Outcome Expressed gratitude

## 2020-08-02 NOTE — PLAN OF CARE
Problem: Pain:  Goal: Pain level will decrease  Description: Pain level will decrease  8/2/2020 0752 by Aniya Mcwilliams RN  Outcome: Ongoing  8/1/2020 2049 by Leah Valadez RN  Outcome: Ongoing  Goal: Control of acute pain  Description: Control of acute pain  8/2/2020 0752 by Aniya Mcwilliams RN  Outcome: Ongoing  8/1/2020 2049 by Leah Valadez RN  Outcome: Ongoing  Goal: Control of chronic pain  Description: Control of chronic pain  8/2/2020 0752 by Aniya Mcwilliams RN  Outcome: Ongoing  8/1/2020 2049 by Leah Valadez RN  Outcome: Ongoing     Problem: Falls - Risk of:  Goal: Will remain free from falls  Description: Will remain free from falls  8/2/2020 0752 by Aniya Mcwilliams RN  Outcome: Ongoing  8/1/2020 2049 by Leah Valadez RN  Outcome: Ongoing  Goal: Absence of physical injury  Description: Absence of physical injury  8/2/2020 0752 by Aniya Mcwilliams RN  Outcome: Ongoing  8/1/2020 2049 by Leah Valadez RN  Outcome: Ongoing

## 2020-08-03 VITALS
BODY MASS INDEX: 26.63 KG/M2 | HEIGHT: 64 IN | DIASTOLIC BLOOD PRESSURE: 59 MMHG | OXYGEN SATURATION: 91 % | SYSTOLIC BLOOD PRESSURE: 95 MMHG | HEART RATE: 71 BPM | RESPIRATION RATE: 14 BRPM | TEMPERATURE: 97.6 F | WEIGHT: 156 LBS

## 2020-08-03 PROBLEM — K56.609 SMALL BOWEL OBSTRUCTION (HCC): Status: RESOLVED | Noted: 2020-07-31 | Resolved: 2020-08-03

## 2020-08-03 PROCEDURE — C9113 INJ PANTOPRAZOLE SODIUM, VIA: HCPCS | Performed by: SURGERY

## 2020-08-03 PROCEDURE — 6360000002 HC RX W HCPCS: Performed by: SURGERY

## 2020-08-03 PROCEDURE — 6370000000 HC RX 637 (ALT 250 FOR IP): Performed by: INTERNAL MEDICINE

## 2020-08-03 PROCEDURE — 99238 HOSP IP/OBS DSCHRG MGMT 30/<: CPT | Performed by: SURGERY

## 2020-08-03 PROCEDURE — 2580000003 HC RX 258: Performed by: SURGERY

## 2020-08-03 RX ADMIN — PANTOPRAZOLE SODIUM 40 MG: 40 INJECTION, POWDER, FOR SOLUTION INTRAVENOUS at 08:13

## 2020-08-03 RX ADMIN — ONDANSETRON HYDROCHLORIDE 4 MG: 2 INJECTION, SOLUTION INTRAMUSCULAR; INTRAVENOUS at 06:24

## 2020-08-03 RX ADMIN — OXYCODONE HYDROCHLORIDE 40 MG: 40 TABLET, FILM COATED, EXTENDED RELEASE ORAL at 08:13

## 2020-08-03 RX ADMIN — ENOXAPARIN SODIUM 40 MG: 40 INJECTION SUBCUTANEOUS at 08:13

## 2020-08-03 RX ADMIN — ONDANSETRON HYDROCHLORIDE 4 MG: 2 INJECTION, SOLUTION INTRAMUSCULAR; INTRAVENOUS at 00:04

## 2020-08-03 RX ADMIN — SODIUM CHLORIDE: 9 INJECTION, SOLUTION INTRAVENOUS at 08:13

## 2020-08-03 RX ADMIN — GABAPENTIN 400 MG: 400 CAPSULE ORAL at 08:13

## 2020-08-03 ASSESSMENT — PAIN SCALES - GENERAL: PAINLEVEL_OUTOF10: 7

## 2020-08-03 ASSESSMENT — PAIN DESCRIPTION - PROGRESSION: CLINICAL_PROGRESSION: GRADUALLY WORSENING

## 2020-08-03 ASSESSMENT — PAIN DESCRIPTION - LOCATION: LOCATION: BACK

## 2020-08-03 ASSESSMENT — PAIN DESCRIPTION - FREQUENCY: FREQUENCY: CONTINUOUS

## 2020-08-03 ASSESSMENT — PAIN DESCRIPTION - DESCRIPTORS: DESCRIPTORS: ACHING;DISCOMFORT

## 2020-08-03 ASSESSMENT — PAIN DESCRIPTION - ORIENTATION: ORIENTATION: LOWER

## 2020-08-03 ASSESSMENT — PAIN DESCRIPTION - PAIN TYPE: TYPE: CHRONIC PAIN

## 2020-08-03 ASSESSMENT — PAIN DESCRIPTION - ONSET: ONSET: ON-GOING

## 2020-08-03 NOTE — DISCHARGE SUMMARY
her pulse is 71. Her respiration is 14 and oxygen saturation is 91%. General appearance - alert, well appearing, and in no distress and crying  Abdomen - soft, mild lower abdominal tenderness which pt states is \"normmal\" for her, nondistended, no masses or organomegaly  Neurological - motor and sensory grossly normal bilaterally  Musculoskeletal - full range of motion without pain  Extremities - peripheral pulses normal, no pedal edema, no clubbing or cyanosis  Incision: N/A    LABS     Recent Labs     08/01/20  0539 08/02/20  0600   WBC 10.1  --    HGB 11.7*  --    HCT 36.0  --      --     139   K 3.6 3.7   * 112*   CO2 20* 18*   BUN 8 12   CREATININE <0.5* 0.6       DISCHARGE INSTRUCTIONS   Discharge Medications:      Medication List      CONTINUE taking these medications    amitriptyline 100 MG tablet  Commonly known as:  ELAVIL     bisacodyl 5 MG EC tablet  Commonly known as:  DULCOLAX     dicyclomine 20 MG tablet  Commonly known as:  BENTYL     docusate sodium 100 MG capsule  Commonly known as:  COLACE     gabapentin 300 MG capsule  Commonly known as:  NEURONTIN     levocetirizine 5 MG tablet  Commonly known as:  XYZAL     lisinopril-hydroCHLOROthiazide 10-12.5 MG per tablet  Commonly known as:  PRINZIDE;ZESTORETIC     oxybutynin 15 MG extended release tablet  Commonly known as:  DITROPAN XL     * oxyCODONE 15 MG immediate release tablet  Commonly known as:  OXY-IR     * oxyCODONE 40 MG CR tablet  Commonly known as:  OXYCONTIN     PriLOSEC 40 MG delayed release capsule  Generic drug:  omeprazole     promethazine 25 MG tablet  Commonly known as:  PHENERGAN     simvastatin 40 MG tablet  Commonly known as:  ZOCOR         * This list has 2 medication(s) that are the same as other medications prescribed for you. Read the directions carefully, and ask your doctor or other care provider to review them with you.               Diet: home on fulls then, advance slowly to regular diet as tolerated  Activity: activity as tolerated  Wound Care: N/A  Follow-up:  No follow-up needed  Time Spent for discharge: 30 minutes      Electronically signed by MACKENZIE Austin CNP on 8/3/2020 at 12:11 PM

## 2020-08-03 NOTE — FLOWSHEET NOTE
08/02/20 2056   Vital Signs   Temp 97.9 °F (36.6 °C)   Temp Source Oral   Pulse 67   Heart Rate Source Monitor   Resp 16   /67   BP Location Right upper arm   BP Upper/Lower Upper   Patient Position Semi fowlers   Level of Consciousness 0   MEWS Score 1   Oxygen Therapy   SpO2 95 %   O2 Device None (Room air)   Pt A/O x 4 assessment completed. PM meds given. Pt denies any needs at this time.  Call light within reach

## 2020-08-03 NOTE — PROGRESS NOTES
Resting in bed awake. No complaints or needs at present time. AM assessment complete. Alert and oriented. Call light in reach. Patient is able to demonstrated the ability to move from a reclining position to an upright position within the recliner.

## 2020-08-03 NOTE — PLAN OF CARE
Problem: Pain:  Goal: Pain level will decrease  Description: Pain level will decrease  Outcome: Ongoing  Goal: Control of acute pain  Description: Control of acute pain  Outcome: Ongoing     Problem: Falls - Risk of:  Goal: Will remain free from falls  Description: Will remain free from falls  Outcome: Ongoing

## 2022-03-07 ENCOUNTER — HOSPITAL ENCOUNTER (OUTPATIENT)
Age: 52
Discharge: HOME | End: 2022-03-07
Payer: MEDICARE

## 2022-03-07 ENCOUNTER — HOSPITAL ENCOUNTER (OUTPATIENT)
Dept: GENERAL RADIOLOGY | Age: 52
Discharge: HOME | End: 2022-03-07
Payer: MEDICARE

## 2022-03-07 DIAGNOSIS — M25.511 RIGHT SHOULDER PAIN, UNSPECIFIED CHRONICITY: ICD-10-CM

## 2022-03-07 DIAGNOSIS — M79.662 PAIN OF LEFT LOWER LEG: ICD-10-CM

## 2022-03-07 PROCEDURE — 73030 X-RAY EXAM OF SHOULDER: CPT

## 2022-03-07 PROCEDURE — 73590 X-RAY EXAM OF LOWER LEG: CPT

## 2023-09-21 NOTE — PROGRESS NOTES
Patient in bed with eyes closed. C/o pain at abd. Writer checks SBP and below 100. Patient was educated all night on Vitals and why pain meds would effect. Patient agreed and will continue to monitor. Patient did keep meds from earlier down. C/o nausea at this time and meds given. All safety maintained and call light within reach. 1% Lidocaine

## 2024-08-17 ENCOUNTER — HOSPITAL ENCOUNTER (EMERGENCY)
Age: 54
Discharge: HOME | End: 2024-08-17
Attending: STUDENT IN AN ORGANIZED HEALTH CARE EDUCATION/TRAINING PROGRAM
Payer: MEDICARE

## 2024-08-17 ENCOUNTER — APPOINTMENT (OUTPATIENT)
Dept: CT IMAGING | Age: 54
End: 2024-08-17
Payer: MEDICARE

## 2024-08-17 VITALS
DIASTOLIC BLOOD PRESSURE: 90 MMHG | SYSTOLIC BLOOD PRESSURE: 108 MMHG | HEART RATE: 97 BPM | OXYGEN SATURATION: 98 % | RESPIRATION RATE: 16 BRPM | TEMPERATURE: 98.6 F

## 2024-08-17 DIAGNOSIS — G56.12 LEFT MEDIAN NERVE NEUROPATHY: ICD-10-CM

## 2024-08-17 DIAGNOSIS — M54.32 SCIATICA OF LEFT SIDE: Primary | ICD-10-CM

## 2024-08-17 PROCEDURE — 72131 CT LUMBAR SPINE W/O DYE: CPT

## 2024-08-17 PROCEDURE — 6370000000 HC RX 637 (ALT 250 FOR IP): Performed by: STUDENT IN AN ORGANIZED HEALTH CARE EDUCATION/TRAINING PROGRAM

## 2024-08-17 PROCEDURE — 6360000002 HC RX W HCPCS: Performed by: STUDENT IN AN ORGANIZED HEALTH CARE EDUCATION/TRAINING PROGRAM

## 2024-08-17 PROCEDURE — 99284 EMERGENCY DEPT VISIT MOD MDM: CPT

## 2024-08-17 PROCEDURE — 96372 THER/PROPH/DIAG INJ SC/IM: CPT

## 2024-08-17 RX ORDER — LIDOCAINE 4 G/G
1 PATCH TOPICAL ONCE
Status: DISCONTINUED | OUTPATIENT
Start: 2024-08-17 | End: 2024-08-17 | Stop reason: HOSPADM

## 2024-08-17 RX ORDER — METHOCARBAMOL 750 MG/1
750 TABLET, FILM COATED ORAL 4 TIMES DAILY
Qty: 40 TABLET | Refills: 0 | Status: SHIPPED | OUTPATIENT
Start: 2024-08-17 | End: 2024-08-27

## 2024-08-17 RX ORDER — METHOCARBAMOL 500 MG/1
750 TABLET, FILM COATED ORAL ONCE
Status: COMPLETED | OUTPATIENT
Start: 2024-08-17 | End: 2024-08-17

## 2024-08-17 RX ORDER — IBUPROFEN 800 MG/1
800 TABLET ORAL EVERY 8 HOURS PRN
Qty: 30 TABLET | Refills: 0 | Status: SHIPPED | OUTPATIENT
Start: 2024-08-17

## 2024-08-17 RX ORDER — KETOROLAC TROMETHAMINE 15 MG/ML
15 INJECTION, SOLUTION INTRAMUSCULAR; INTRAVENOUS ONCE
Status: COMPLETED | OUTPATIENT
Start: 2024-08-17 | End: 2024-08-17

## 2024-08-17 RX ORDER — LIDOCAINE 50 MG/G
1 PATCH TOPICAL DAILY
Qty: 10 PATCH | Refills: 0 | Status: SHIPPED | OUTPATIENT
Start: 2024-08-17 | End: 2024-08-27

## 2024-08-17 RX ADMIN — KETOROLAC TROMETHAMINE 15 MG: 15 INJECTION, SOLUTION INTRAMUSCULAR; INTRAVENOUS at 04:33

## 2024-08-17 RX ADMIN — METHOCARBAMOL TABLETS 750 MG: 500 TABLET, COATED ORAL at 04:28

## 2024-08-17 RX ADMIN — OXYCODONE 20 MG: 15 TABLET ORAL at 07:48

## 2024-08-17 ASSESSMENT — PAIN DESCRIPTION - DESCRIPTORS: DESCRIPTORS: SHARP;THROBBING

## 2024-08-17 ASSESSMENT — PAIN DESCRIPTION - ORIENTATION: ORIENTATION: LOWER;LEFT

## 2024-08-17 ASSESSMENT — PAIN DESCRIPTION - LOCATION
LOCATION: BACK
LOCATION: BACK

## 2024-08-17 ASSESSMENT — PAIN SCALES - GENERAL
PAINLEVEL_OUTOF10: 7
PAINLEVEL_OUTOF10: 7

## 2024-08-17 NOTE — ED NOTES
Pt has d/c order. D/C instructions given.  Prescriptions given.  Pt verbalized understanding.  Pt out to lobby to wait for friend to pick her up.

## 2024-08-17 NOTE — ED PROVIDER NOTES
[facility] ED     EMERGENCY DEPARTMENT ENCOUNTER         Pt Name: Mae Sharp   MRN: [id number]   Birthdate 1970   Date of evaluation: 8/17/2024   Provider: Juan Lopez MD   PCP: Elvis Persaud DO   Note Started: 6:09 AM EDT 8/17/24       Chief Complaint     Back Pain (Patient states she was sitting on her bed and her god daughter accidentally kicked her in the back when she was bent over. Patient  states she sat up really quick and when she turned she felt a shooting pain up to her head. Now states her left side is weak and she is numb on left thumb and pointer finger. Has a hx of chronic back pain. )      History of Present Illness     Mae Sharp is a 53 y.o. female who presents with lower back and left buttock pain with some radiation into the left thigh.  The patient states that she was lying in her bed tonight and went to stand up.  She was sitting on the edge of the bed and her 4-year-old goddaughter who is also sleeping in the bed kicked her in the lower back.  The patient sat up abruptly and subsequently had severe pain to the left lower lumbar paraspinal muscles with some radiation into the left gluteal region and thigh.  Her pain was persistent and she developed some other symptoms with shooting pains throughout her back as well as numbness in her extremities including fingertips particular of the left hand and given her progressive symptoms she presents for evaluation.  She arrives via ambulance on arrival here she is tearful and mildly agitated has some difficulty providing history.  She denies alcohol or drug use.  Denies any other recent changes in her health no febrile illness no other trauma.      I have reviewed the nursing notes and agree unless otherwise noted.    Review of Systems     Positives and pertinent negatives as per HPI.    Past Medical, Surgical, Family, and Social History     She has a past medical history of Collapsed vertebra (HCC), Depression,  separately reportable procedures in the context of the following condition na.  There was a high probability of clinically significant/life threatening deterioration in the patient's condition which required my urgent intervention.    Clinical Impression     1. Sciatica of left side    2. Left median nerve neuropathy        Disposition     PATIENT REFERRED TO:  [facility]  [address]  [address]  [place] [address]  [phone]  Schedule an appointment as soon as possible for a visit in 1 week  To establish primary care    Karely Amor MD  [address] [place] [address]  [phone]      To follow-up numbness in thumb and index finger of left hand.    [facility]  [address] [facility] [id number] [phone]  Schedule an appointment as soon as possible for a visit in 1 week  For hip and leg pain      DISCHARGE MEDICATIONS:  New Prescriptions    IBUPROFEN (ADVIL;MOTRIN) 800 MG TABLET    Take 1 tablet by mouth every 8 hours as needed for Pain (Take with food)    LIDOCAINE (LIDODERM) 5 %    Place 1 patch onto the skin daily for 10 days 12 hours on, 12 hours off.    METHOCARBAMOL (ROBAXIN-750) 750 MG TABLET    Take 1 tablet by mouth 4 times daily for 10 days       DISPOSITION Decision To Discharge 08/17/2024 07:36:17 AM  Condition at Disposition: Blas Lopez MD (electronically signed)  Attending Emergency Physician    Please note this documentation has been produced using speech recognition software and may contain errors related to that system including errors in grammar, punctuation, and spelling, as well as words and phrases that may be inappropriate.  Efforts were made to edit the dictations.         Juan Lopez MD  08/17/24 0643       Juan Lopez MD  08/17/24 2140

## 2024-08-17 NOTE — DISCHARGE INSTRUCTIONS
You were evaluated in the emergency department for lower back and left leg pain as well as numbness of left index finger and thumb. Assessments and testing completed during your visit were reassuring and at this time there is no indication for further testing, treatment or admission to the hospital. Given this it is appropriate to discharge you from the emergency department. At the time of discharge we discussed the following:    Overall I believe you have some inflammation of the nerve of your lower back causing the pain through your left leg.  I expect this to improve with the combination of medications prescribed here.  If however your condition fails to improve you may use the referral to visit with sports medicine for expert guidance.  I have also provided a referral to establish primary care physician for long-term health maintenance.  Finally regarding the numbness in your fingers on the left hand I believe this is some irritation of the median nerve and you may use the wrist brace for comfort but in the long-term I would have you visit with the hand specialist per the referral for expert guidance.    Please note that sometimes it is difficult to diagnose a medical condition early in the disease process before the disease is fully manifest. Because of this, should you develop any new or worsening symptoms, you may return at any time to the emergency department for another evaluation. If available you are also recommended to review this visit with your primary care physician or other medical provider in the next 7 days. Thank you for allowing us to care for you today.

## 2024-08-21 ENCOUNTER — TELEPHONE (OUTPATIENT)
Dept: ORTHOPEDIC SURGERY | Age: 54
End: 2024-08-21